# Patient Record
Sex: MALE | Race: WHITE | URBAN - METROPOLITAN AREA
[De-identification: names, ages, dates, MRNs, and addresses within clinical notes are randomized per-mention and may not be internally consistent; named-entity substitution may affect disease eponyms.]

---

## 2017-07-19 ENCOUNTER — OFFICE VISIT (OUTPATIENT)
Dept: INTERNAL MEDICINE | Facility: CLINIC | Age: 27
End: 2017-07-19
Payer: COMMERCIAL

## 2017-07-19 VITALS
TEMPERATURE: 97.7 F | HEIGHT: 68 IN | DIASTOLIC BLOOD PRESSURE: 60 MMHG | HEART RATE: 56 BPM | BODY MASS INDEX: 19.25 KG/M2 | WEIGHT: 127 LBS | OXYGEN SATURATION: 100 % | SYSTOLIC BLOOD PRESSURE: 96 MMHG

## 2017-07-19 DIAGNOSIS — Z00.00 ROUTINE HISTORY AND PHYSICAL EXAMINATION OF ADULT: Primary | ICD-10-CM

## 2017-07-19 PROCEDURE — 99385 PREV VISIT NEW AGE 18-39: CPT | Performed by: INTERNAL MEDICINE

## 2017-07-19 NOTE — MR AVS SNAPSHOT
"              After Visit Summary   2017    Juan Pino    MRN: [id number]           Patient Information     Date Of Birth          1990        Visit Information        Provider Department      2017 8:15 AM Chio Gayle MD [facility]        Care Instructions    Ask mom when last tetanus was (good for 10 years).           Follow-ups after your visit        Who to contact     If you have questions or need follow up information about today's clinic visit or your schedule please contact [facility] directly at [phone].  Normal or non-critical lab and imaging results will be communicated to you by [employer], letter or phone within 4 business days after the clinic has received the results. If you do not hear from us within 7 days, please contact the clinic through [employer] or phone. If you have a critical or abnormal lab result, we will notify you by phone as soon as possible.  Submit refill requests through [employer] or call your pharmacy and they will forward the refill request to us. Please allow 3 business days for your refill to be completed.          Additional Information About Your Visit        MyChart Information     [employer] lets you send messages to your doctor, view your test results, renew your prescriptions, schedule appointments and more. To sign up, go to [email or web address] . Click on \"Log in\" on the left side of the screen, which will take you to the Welcome page. Then click on \"Sign up Now\" on the right side of the page.     You will be asked to enter the access code listed below, as well as some personal information. Please follow the directions to create your username and password.     Your access code is: [email or web address]  Expires: 10/17/2017  8:41 AM     Your access code will  in 90 days. If you need help or a new code, please call your [facility] or [phone].        Care EveryWhere ID     This is your Care EveryWhere " "ID. This could be used by other organizations to access your [facility] medical records  [id number]        Your Vitals Were     Pulse Temperature Height Pulse Oximetry BMI (Body Mass Index)       56 97.7  F (36.5  C) (Oral) 5' 8\" (1.727 m) 100% 19.31 kg/m2        Blood Pressure from Last 3 Encounters:   07/19/17 96/60    Weight from Last 3 Encounters:   07/19/17 127 lb (57.6 kg)              Today, you had the following     No orders found for display       Primary Care Provider    None Specified       No primary provider on file.        Equal Access to Services     [facility]: Hadii chapito fraser Sosoledad, waferdinandda luqadaha, qayuki kaalmada edenilson, silva stevens . [facility] [phone].    ATENCIÓN: Si habla español, tiene a chowdhury disposición servicios gratuitos de asistencia lingüística. Llame al [phone].    We comply with applicable federal civil rights laws and [place] laws. We do not discriminate on the basis of race, color, national origin, age, disability sex, sexual orientation or gender identity.            Thank you!     Thank you for choosing [facility]  for your care. Our goal is always to provide you with excellent care. Hearing back from our patients is one way we can continue to improve our services. Please take a few minutes to complete the written survey that you may receive in the mail after your visit with us. Thank you!             Your Updated Medication List - Protect others around you: Learn how to safely use, store and throw away your medicines at www.disposemymeds.org.      Notice  As of 7/19/2017  8:41 AM    You have not been prescribed any medications.      "

## 2017-07-19 NOTE — PROGRESS NOTES
SUBJECTIVE:                                                      HPI: Juan Pino is a pleasant 27 year old male who presents for a physical.    No specific complaints, concerns, or questions.    ROS:  Constitutional: denies unintentional weight loss or gain; denies fevers, chills, or sweats     Cardiovascular: denies chest pain, palpitations, or edema  Respiratory: denies cough, wheezing, shortness of breath, or dyspnea on exertion  Gastrointestinal: denies nausea, vomiting, constipation, diarrhea, or abdominal pain  Genitourinary: denies urinary frequency, urgency, dysuria, or hematuria  Integumentary: denies rash or pruritus  Musculoskeletal: denies back pain, muscle pain, joint pain, or joint swelling  Neurologic: denies focal weakness, numbness, or tingling  Hematologic/Immunologic: denies history of anemia or blood transfusions  Endocrine: denies heat or cold intolerance; denies polyuria, polydipsia  Psychiatric: denies anxiety; see preventative health below    Past Medical History:   Diagnosis Date     Seasonal allergies      Past Surgical History:   Procedure Laterality Date     NO HISTORY OF SURGERY       Family History   Problem Relation Age of Onset     Type 2 Diabetes Father      Prostate Cancer Father      Bladder Cancer Father      CANCER Father      uretheral CA     Brain Cancer Father      Other - See Comments Father 67     passed away from cardiac arrest     KIDNEY DISEASE Father      periodic HD     Alzheimer Disease Father      started in 60s     Hypertension Father      Myocardial Infarction No family hx of      CEREBROVASCULAR DISEASE No family hx of      Coronary Artery Disease Early Onset No family hx of      Colon Cancer No family hx of      Occupational History     Retail      Social History Main Topics     Smoking status: Never Smoker     Smokeless tobacco: Never Used     Alcohol use Yes      Comment: rare     Drug use: No     Sexual activity: Not Currently     Social History Narrative     "Single.    No kids.     Swimming and running 3x/week.      No Known Allergies     MEDS: None    OBJECTIVE:                                                      BP 96/60 (BP Location: Left arm, Patient Position: Chair, Cuff Size: Adult Regular)  Pulse 56  Temp 97.7  F (36.5  C) (Oral)  Ht 5' 8\" (1.727 m)  Wt 127 lb (57.6 kg)  SpO2 100%  BMI 19.31 kg/m2  Constitutional: well-appearing  Eyes: normal conjunctivae and lids; pupils equal, round, and reactive to light  Ears, Nose, Mouth, and Throat: normal ears and nose; tympanic membranes visualized and normal; normal lips, teeth, and gums; no oropharyngeal lesions or ulcers  Neck: supple and symmetric; no lymphadenopathy; no thyromegaly or masses  Respiratory: normal respiratory effort; clear to auscultation bilaterally  Cardiovascular: regular rate and rhythm; pedal pulses palpable; no edema  Gastrointestinal: soft, non-tender, non-distended, and bowel sounds present; no organomegaly or masses  Musculoskeletal: normal gait and station; no clubbing or cyanosis  Psych: normal judgment and insight; normal mood and affect; recent and remote memory intact; oriented to time, place, and person  Integumentary: keloid scar ~1.5 (top to bottom) x 4cm (across) upper sternum    PREVENTATIVE HEALTH                                                      BMI: within normal limits   Blood pressure: within normal limits   Prostate Cancer Screening: not medically indicated at this time   AAA screening: not medically indicated at this time   Colonoscopy: not medically indicated at this time   Screening HCV: not medically indicated at this time   Screening cholesterol: not medically indicated at this time   Screening diabetes: not medically indicated at this time   STD testing: no risk factors present  Depression screening: PHQ-2 assessment completed and reviewed - no intervention indicated at this time  Alcohol misuse screening: alcohol use reviewed - no intervention indicated at this " time  Immunizations: reviewed; patient thinks last tetanus booster was in last 10 years - will check with family    ASSESSMENT/PLAN:                                                       (Z00.00) Routine history and physical examination of adult  (primary encounter diagnosis)  Comment: PMH, PSH, FH, SH, medications, allergies, immunizations, and preventative health measures reviewed.   Plan:    - patient will check on date of last tetanus booster.   - no other preventative health interventions indicated at this time.     The instructions on the AVS were discussed and explained to the patient. Patient expressed understanding of instructions.    Chio Gayle MD   [address] [address]  T: [phone], F: [phone]

## 2017-07-19 NOTE — NURSING NOTE
"Chief Complaint   Patient presents with     Establish Care     Physical     Pt is fasting today.       Initial BP 96/60 (BP Location: Left arm, Patient Position: Chair, Cuff Size: Adult Regular)  Pulse 56  Temp 97.7  F (36.5  C) (Oral)  Ht 5' 8\" (1.727 m)  Wt 127 lb (57.6 kg)  SpO2 100%  BMI 19.31 kg/m2 Estimated body mass index is 19.31 kg/(m^2) as calculated from the following:    Height as of this encounter: 5' 8\" (1.727 m).    Weight as of this encounter: 127 lb (57.6 kg).  Medication Reconciliation: complete     Marie AMANDA      "